# Patient Record
Sex: FEMALE | Race: BLACK OR AFRICAN AMERICAN | NOT HISPANIC OR LATINO | Employment: FULL TIME | URBAN - METROPOLITAN AREA
[De-identification: names, ages, dates, MRNs, and addresses within clinical notes are randomized per-mention and may not be internally consistent; named-entity substitution may affect disease eponyms.]

---

## 2023-03-25 LAB
CLUE CELLS: NORMAL
NUGENT SCORE: 0
YEAST: NORMAL

## 2023-05-19 LAB
ALANINE AMINOTRANSFERASE (SGPT) (U/L) IN SER/PLAS: 11 U/L (ref 7–45)
ALBUMIN (G/DL) IN SER/PLAS: 3.8 G/DL (ref 3.4–5)
ALKALINE PHOSPHATASE (U/L) IN SER/PLAS: 43 U/L (ref 33–110)
ANION GAP IN SER/PLAS: 12 MMOL/L (ref 10–20)
ASPARTATE AMINOTRANSFERASE (SGOT) (U/L) IN SER/PLAS: 14 U/L (ref 9–39)
BILIRUBIN TOTAL (MG/DL) IN SER/PLAS: 0.6 MG/DL (ref 0–1.2)
CALCIUM (MG/DL) IN SER/PLAS: 9.7 MG/DL (ref 8.6–10.6)
CARBON DIOXIDE, TOTAL (MMOL/L) IN SER/PLAS: 29 MMOL/L (ref 21–32)
CHLORIDE (MMOL/L) IN SER/PLAS: 103 MMOL/L (ref 98–107)
CHOLESTEROL (MG/DL) IN SER/PLAS: 186 MG/DL (ref 0–199)
CHOLESTEROL IN HDL (MG/DL) IN SER/PLAS: 65.1 MG/DL
CHOLESTEROL/HDL RATIO: 2.9
CREATININE (MG/DL) IN SER/PLAS: 0.84 MG/DL (ref 0.5–1.05)
ERYTHROCYTE DISTRIBUTION WIDTH (RATIO) BY AUTOMATED COUNT: 13.1 % (ref 11.5–14.5)
ERYTHROCYTE MEAN CORPUSCULAR HEMOGLOBIN CONCENTRATION (G/DL) BY AUTOMATED: 30.6 G/DL (ref 32–36)
ERYTHROCYTE MEAN CORPUSCULAR VOLUME (FL) BY AUTOMATED COUNT: 86 FL (ref 80–100)
ERYTHROCYTES (10*6/UL) IN BLOOD BY AUTOMATED COUNT: 4.99 X10E12/L (ref 4–5.2)
ESTIMATED AVERAGE GLUCOSE FOR HBA1C: 134 MG/DL
GFR FEMALE: 87 ML/MIN/1.73M2
GLUCOSE (MG/DL) IN SER/PLAS: 84 MG/DL (ref 74–99)
HEMATOCRIT (%) IN BLOOD BY AUTOMATED COUNT: 42.8 % (ref 36–46)
HEMOGLOBIN (G/DL) IN BLOOD: 13.1 G/DL (ref 12–16)
HEMOGLOBIN A1C/HEMOGLOBIN TOTAL IN BLOOD: 6.3 %
LDL: 108 MG/DL (ref 0–99)
LEUKOCYTES (10*3/UL) IN BLOOD BY AUTOMATED COUNT: 5.3 X10E9/L (ref 4.4–11.3)
NRBC (PER 100 WBCS) BY AUTOMATED COUNT: 0 /100 WBC (ref 0–0)
PLATELETS (10*3/UL) IN BLOOD AUTOMATED COUNT: 216 X10E9/L (ref 150–450)
POTASSIUM (MMOL/L) IN SER/PLAS: 3.8 MMOL/L (ref 3.5–5.3)
PROTEIN TOTAL: 7.1 G/DL (ref 6.4–8.2)
SODIUM (MMOL/L) IN SER/PLAS: 140 MMOL/L (ref 136–145)
TRIGLYCERIDE (MG/DL) IN SER/PLAS: 65 MG/DL (ref 0–149)
UREA NITROGEN (MG/DL) IN SER/PLAS: 13 MG/DL (ref 6–23)
VLDL: 13 MG/DL (ref 0–40)

## 2023-10-31 ENCOUNTER — PHARMACY VISIT (OUTPATIENT)
Dept: PHARMACY | Facility: CLINIC | Age: 48
End: 2023-10-31
Payer: COMMERCIAL

## 2023-10-31 DIAGNOSIS — I10 ESSENTIAL HYPERTENSION: Primary | ICD-10-CM

## 2023-10-31 PROCEDURE — RXMED WILLOW AMBULATORY MEDICATION CHARGE

## 2023-10-31 RX ORDER — HYDROCHLOROTHIAZIDE 25 MG/1
25 TABLET ORAL DAILY
Qty: 90 TABLET | Refills: 1 | Status: SHIPPED | OUTPATIENT
Start: 2023-10-31 | End: 2024-04-29 | Stop reason: SDUPTHER

## 2023-11-05 PROBLEM — S54.22XA: Status: ACTIVE | Noted: 2023-11-05

## 2023-11-05 PROBLEM — E55.9 VITAMIN D DEFICIENCY: Status: ACTIVE | Noted: 2023-11-05

## 2023-11-05 PROBLEM — M20.62 TOE DEFORMITY, LEFT: Status: ACTIVE | Noted: 2023-11-05

## 2023-11-05 PROBLEM — M25.462 KNEE EFFUSION, LEFT: Status: ACTIVE | Noted: 2023-11-05

## 2023-11-05 PROBLEM — R55 PRE-SYNCOPE: Status: ACTIVE | Noted: 2023-11-05

## 2023-11-05 PROBLEM — R25.1 TREMOR: Status: ACTIVE | Noted: 2023-11-05

## 2023-11-05 PROBLEM — B95.1 GBBS (GROUP B BETA HEMOLYTIC STREPTOCOCCUS) PHARYNGITIS: Status: ACTIVE | Noted: 2023-11-05

## 2023-11-05 PROBLEM — M54.50 LOWER BACK PAIN: Status: ACTIVE | Noted: 2023-11-05

## 2023-11-05 PROBLEM — J02.9 PHARYNGITIS, ACUTE: Status: ACTIVE | Noted: 2023-11-05

## 2023-11-05 PROBLEM — J45.909 ASTHMA (HHS-HCC): Status: ACTIVE | Noted: 2023-11-05

## 2023-11-05 PROBLEM — N64.89 BREAST ASYMMETRY: Status: ACTIVE | Noted: 2023-11-05

## 2023-11-05 PROBLEM — H52.229 ASTIGMATISM, REGULAR: Status: ACTIVE | Noted: 2023-11-05

## 2023-11-05 PROBLEM — M25.511 RIGHT SHOULDER PAIN: Status: ACTIVE | Noted: 2023-11-05

## 2023-11-05 PROBLEM — R10.2 PELVIC PAIN: Status: ACTIVE | Noted: 2023-11-05

## 2023-11-05 PROBLEM — M22.2X2 PATELLOFEMORAL DISORDER, LEFT: Status: ACTIVE | Noted: 2023-11-05

## 2023-11-05 PROBLEM — J02.0 GBBS (GROUP B BETA HEMOLYTIC STREPTOCOCCUS) PHARYNGITIS: Status: ACTIVE | Noted: 2023-11-05

## 2023-11-05 PROBLEM — M79.675 TOE PAIN, CHRONIC, LEFT: Status: ACTIVE | Noted: 2023-11-05

## 2023-11-05 PROBLEM — K21.9 GERD WITHOUT ESOPHAGITIS: Status: ACTIVE | Noted: 2023-11-05

## 2023-11-05 PROBLEM — M75.41 IMPINGEMENT SYNDROME OF RIGHT SHOULDER: Status: ACTIVE | Noted: 2023-11-05

## 2023-11-05 PROBLEM — J01.40 ACUTE NON-RECURRENT PANSINUSITIS: Status: ACTIVE | Noted: 2023-11-05

## 2023-11-05 PROBLEM — M25.549 PAIN IN JOINT, HAND: Status: ACTIVE | Noted: 2023-11-05

## 2023-11-05 PROBLEM — N63.20 LEFT BREAST LUMP: Status: ACTIVE | Noted: 2023-11-05

## 2023-11-05 PROBLEM — R10.9 ABDOMINAL PAIN, RIGHT LATERAL: Status: ACTIVE | Noted: 2023-11-05

## 2023-11-05 PROBLEM — I10 ESSENTIAL HYPERTENSION: Status: ACTIVE | Noted: 2023-11-05

## 2023-11-05 PROBLEM — G89.29 TOE PAIN, CHRONIC, LEFT: Status: ACTIVE | Noted: 2023-11-05

## 2023-11-07 ENCOUNTER — TELEPHONE (OUTPATIENT)
Dept: PRIMARY CARE | Facility: CLINIC | Age: 48
End: 2023-11-07

## 2023-11-07 ENCOUNTER — PHARMACY VISIT (OUTPATIENT)
Dept: PHARMACY | Facility: CLINIC | Age: 48
End: 2023-11-07
Payer: COMMERCIAL

## 2023-11-07 ENCOUNTER — OFFICE VISIT (OUTPATIENT)
Dept: PRIMARY CARE | Facility: CLINIC | Age: 48
End: 2023-11-07
Payer: COMMERCIAL

## 2023-11-07 VITALS — BODY MASS INDEX: 27.28 KG/M2 | WEIGHT: 180 LBS | HEIGHT: 68 IN

## 2023-11-07 DIAGNOSIS — J45.20 INTERMITTENT ASTHMA, UNSPECIFIED ASTHMA SEVERITY, UNSPECIFIED WHETHER COMPLICATED (HHS-HCC): Primary | ICD-10-CM

## 2023-11-07 PROCEDURE — 99213 OFFICE O/P EST LOW 20 MIN: CPT | Performed by: FAMILY MEDICINE

## 2023-11-07 PROCEDURE — 3080F DIAST BP >= 90 MM HG: CPT | Performed by: FAMILY MEDICINE

## 2023-11-07 PROCEDURE — 3077F SYST BP >= 140 MM HG: CPT | Performed by: FAMILY MEDICINE

## 2023-11-07 PROCEDURE — RXMED WILLOW AMBULATORY MEDICATION CHARGE

## 2023-11-07 RX ORDER — ALBUTEROL SULFATE 90 UG/1
2 AEROSOL, METERED RESPIRATORY (INHALATION) EVERY 4 HOURS PRN
Qty: 8.5 G | Refills: 0 | Status: SHIPPED | OUTPATIENT
Start: 2023-11-07 | End: 2024-11-06

## 2023-11-07 NOTE — PROGRESS NOTES
"Chief Complaint:   No chief complaint on file.     History Of Present Illness:    Niranjan Rodrigues is a 48 y.o. female with a past medical history of asthma, recurrent sinusitis and seasonal allergies who is presenting for blood pressure.        She would like a rescue inhaler.      May 2021 visit: Left knee: She has had left knee pain for the past few months even before her GYN procedure in March. She is trying to get in good shape and go to the gym. She notices it most when she is sitting and then stands up she will notice in the posterior aspect of her left knee. She does like to bowl and uses an knee brace, we discussed getting another knee brace for her which would offer more support. She likes participate in water aerobics but still has knee pain with water aerobics. She does squats in the gym which do cause pain in the anterior aspect of her left knee.   May 2023 update: injection was temporarily helpful, does not want another injection now. she is still bowling. reaction knee brace is helpful but she needs another one.   Nov 2023 update: still didn't get reaction knee brace. Would like it for bowling.           Anemia - CBC done in October 2020, but was told she had low iron levels, however low suspicion as MCV within normal limits.     Left chest wall pain. Had before in 2015 and saw cardiologist.      Allergies - seasonal allergies that last year-round and has sinus-related symptoms (dry eyes, runny nose, sore throat). Has tried nasal spray in past with no relief. Patient is agreeable to seeing Allergy Specialist in future. April 2022 update: has seen [facility], feels better on nasal sprays. needs albuterol refill. take claritin or zyrtec. May 2023 update: she thinks she may have a sinus infection. coughing up phlegm, we discussed an antibiotic.      Asthma - Patient takes Albuterol PRN and uses 1-2 times on \"bad day\". No recent exacerbations, but would like refill of Albuterol.     BP/HLD - concerns as " family history is indicates Hypertension and Hyperlipidemia. Patient instructed to get blood pressure cuff and maintain a blood pressure diary and report back on follow-up. Patient has borderline high cholesterol, but dietary modifications and exercise have worked well for her. 2022 update: agreed to start hctz and get a home bp monitor. Oct 2022 update: 100s-120 systolic. agreed to continue hctz. sometimes she notices blood pressure spikes with certain foods.         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 2022 update: she will see ortho/Est[facility]. Oct 2022 update: hemp topical seems to be helping.      PMHx: Asthma, arthritis of wrist (Left), bursitis of shoulder (right), sinusitis  Past surgical Hx: partial hysterectomy with left oophorectomy secondary to ovarian mass, nerve entrapment of left wrist. GYN procedure .   Allergies: seasonal, Advil (palpitations), Tramadol, Tylenol 3  Medications: Aleve, Albuterol. estradiol.    OTC: Biotin, Vitamin-C, Vitamin-D, elderberry, multivitamin  Social: no tobacco use, alcohol 1-2 times per month socially, soda 1-2 times per week, exercises regularly (4 times weekly, bowling twice weekly, water aerobics weekly), avoids red meat and pork,   Occupation:  ( employee). Medical records (previously at [facility] [place] [facility]) at [facility].   Family History: Mom -  at 61 from lung cancer, sister -  at 44 from esophageal cancer, father -  (history of emphysema). Diabetes, Hypertension, hyperlipidemia on maternal side        Tdap: 2021  COVID: vaccinated and boosted.   Mammogram: 2023  GYN: saw GYN 2023.      Colonoscopy/EGD: May 2018- unremarkable. repeat colonoscopy . .       Review of Systems:    Negative  Constitutional: fever, chills, night sweats,  unexpected weight change, changes in sleep  Eyes: loss of vision, double vision, floaters  Ear/Nose/Throat/Mouth: sore throat, hearing changes, sinus congestion  Cardiovascular: chest pain, chest heaviness, palpitations, swelling in ankles  Psychological: feelings of depression, feelings of anxiety.  Endocrine: excessive thirst, feeling too hot, feeling too cold, feeling tired, fatigue  Hematologic/Lymphatic: swollen glands, blood clotting problems, easy bruising.   Neurological: loss of consciousness, tremors, dizzy spells, numbness, tingling.  Gastrointestinal: abdominal pain, nausea, vomiting, indigestion, heartburn, sour taste in throat when waking up, constipation, diarrhea, bloody stools, loss of bowel control  Genitourinary: urinary incontinence, increased urinary frequency, painful urination, blood in urine  Skin: Rash, lumps or bumps, worrisome moles  Sexual: sexual health concerns      Positive  Psychological: feeling generally happy, feeling safe at home      Last Recorded Vitals:  There were no vitals filed for this visit.    Past Medical History:  She has a past medical history of Abnormal uterine and vaginal bleeding, unspecified, Acute vaginitis (10/24/2016), Acute vaginitis (01/04/2016), Encounter for screening for malignant neoplasm of vagina, Other conditions influencing health status, Other injury of unspecified body region, initial encounter (08/29/2014), Other specified noninflammatory disorders of vagina (12/31/2015), Other specified noninflammatory disorders of vagina (05/27/2016), Pain, unspecified (03/22/2018), Personal history of diseases of the skin and subcutaneous tissue (09/23/2013), Personal history of other diseases of the female genital tract (04/27/2015), Personal history of other diseases of the female genital tract, Personal history of other diseases of the respiratory system, Personal history of other medical treatment, Personal history of other specified conditions (12/08/2015),  Urinary tract infection, site not specified (11/27/2018), and Urinary tract infection, site not specified (09/16/2017).    Past Surgical History:  She has a past surgical history that includes Other surgical history (03/10/2014); Other surgical history (03/10/2014); Other surgical history (03/23/2021); and Hysterectomy (03/21/2018).      Social History:  She has no history on file for tobacco use, alcohol use, and drug use.    Family History:  Family History   Problem Relation Name Age of Onset    Diabetes Mother      Other (chronic kidney disease) Mother      Lung cancer Mother      Emphysema Father      Other (esophageal abnormality [Other]) Sister      Cancer Sister      Diabetes Maternal Grandmother          Allergies:  Acetaminophen-codeine, Oxycodone-acetaminophen, Shellfish containing products, Shrimp, and Tramadol    Outpatient Medications:  Current Outpatient Medications   Medication Instructions    estradiol (Estrace) 0.01 % (0.1 mg/gram) vaginal cream INSERT [1] GRAM VAGINALLY TWO NIGHTS PER WEEK    estradiol (Vivelle-DOT) 0.075 mg/24 hr patch APPLY 1 PATCH TRANSDERMALLY TWO TIMES A WEEK    hydroCHLOROthiazide (HYDRODiuril) 25 mg tablet TAKE 1 TABLET BY MOUTH DAILY AS DIRECTED       Physical Exam:  GENERAL: Well developed, well nourished, alert and cooperative, and appears to be in no acute distress.  PSYCH: mood pleasant and appropriate  HEAD: normocephalic  EYES: PERRL, EOMI. vision is grossly intact.   EARS: Hearing grossly intact. External auditory canals clear. Right tympanic membrane clear. Left tympanic membrane clear.  NOSE:  Nares patent b/l. No bleeding nasal polyps. No nasal discharge.  THROAT:  Oral cavity and pharynx clear. No inflammation, swelling, exudate, or lesions.  Teeth and gingiva in good general condition.  NECK: Neck supple, non-tender. No masses, no thyromegaly. No anterior cervical lymphadenopathy.  CARDIAC: Normal S1 and S2. No murmur. Rhythm is regular. Brisk capillary refill.  No carotid bruits. No appreciable JVD.  LUNGS: Clear to auscultation without rales, rhonchi, wheezing.  ABD: soft  GAIT: Normal  SKIN: Skin normal color, texture and turgor with no lesions or eruptions.     Last Labs:  CBC -  Lab Results   Component Value Date    WBC 5.3 05/19/2023    HGB 13.1 05/19/2023    HCT 42.8 05/19/2023    MCV 86 05/19/2023     05/19/2023       CMP -  Lab Results   Component Value Date    CALCIUM 9.7 05/19/2023    PROT 7.1 05/19/2023    ALBUMIN 3.8 05/19/2023    AST 14 05/19/2023    ALT 11 05/19/2023    ALKPHOS 43 05/19/2023    BILITOT 0.6 05/19/2023       LIPID PANEL -   Lab Results   Component Value Date    CHOL 186 05/19/2023    TRIG 65 05/19/2023    HDL 65.1 05/19/2023    CHHDL 2.9 05/19/2023    LDLF 108 (H) 05/19/2023    VLDL 13 05/19/2023         Lab Results   Component Value Date    HGBA1C 6.3 (A) 05/19/2023       Assessment/Plan   Problem List Items Addressed This Visit             ICD-10-CM    Asthma - Primary J45.909     Inhaler rx done.    A1c test was going in the wrong direction- when we recheck labs in 5/2024- hope that it is heading in the right direction. Good job on weight loss.    Will look into reaction knee brace.     Follow up May 2024.     Hung Zhao,

## 2023-12-01 DIAGNOSIS — M22.2X2 PATELLOFEMORAL DISORDER, LEFT: Primary | ICD-10-CM

## 2023-12-01 NOTE — PROGRESS NOTES
Pt plans to stop by Dec 4; 1230pm to  knee brace.    Patient was prescribed a DonJoy reaction brace for left patellofemoral syndrome.The patient is ambulatory with or without aid; but, has weakness, instability and/or deformity of their left knee which requires stabilization from this orthosis to improve their function.      Verbal and written instructions for the use, wear schedule, cleaning and application of this item were given.  Patient was instructed that should the brace result in increased pain, decreased sensation, increased swelling, or an overall worsening of their medical condition, to please contact our office immediately.     Orthotic management and training was provided for skin care, modifications due to healing tissues, edema changes, interruption in skin integrity, and safety precautions with the orthosis.

## 2023-12-06 PROCEDURE — RXMED WILLOW AMBULATORY MEDICATION CHARGE

## 2023-12-08 ENCOUNTER — PHARMACY VISIT (OUTPATIENT)
Dept: PHARMACY | Facility: CLINIC | Age: 48
End: 2023-12-08
Payer: COMMERCIAL

## 2024-01-04 PROCEDURE — RXMED WILLOW AMBULATORY MEDICATION CHARGE

## 2024-01-05 ENCOUNTER — PHARMACY VISIT (OUTPATIENT)
Dept: PHARMACY | Facility: CLINIC | Age: 49
End: 2024-01-05
Payer: COMMERCIAL

## 2024-01-18 ENCOUNTER — OFFICE VISIT (OUTPATIENT)
Dept: ORTHOPEDIC SURGERY | Facility: HOSPITAL | Age: 49
End: 2024-01-18
Payer: COMMERCIAL

## 2024-01-18 ENCOUNTER — HOSPITAL ENCOUNTER (OUTPATIENT)
Dept: RADIOLOGY | Facility: HOSPITAL | Age: 49
Discharge: HOME | End: 2024-01-18
Payer: COMMERCIAL

## 2024-01-18 VITALS — WEIGHT: 174 LBS | BODY MASS INDEX: 26.37 KG/M2 | HEIGHT: 68 IN

## 2024-01-18 DIAGNOSIS — M25.562 LEFT KNEE PAIN: ICD-10-CM

## 2024-01-18 DIAGNOSIS — S83.242A TEAR OF MEDIAL MENISCUS OF LEFT KNEE, UNSPECIFIED TEAR TYPE, UNSPECIFIED WHETHER OLD OR CURRENT TEAR, INITIAL ENCOUNTER: ICD-10-CM

## 2024-01-18 DIAGNOSIS — M25.562 LEFT KNEE PAIN: Primary | ICD-10-CM

## 2024-01-18 PROCEDURE — 1036F TOBACCO NON-USER: CPT | Performed by: ORTHOPAEDIC SURGERY

## 2024-01-18 PROCEDURE — 73562 X-RAY EXAM OF KNEE 3: CPT | Mod: LEFT SIDE | Performed by: RADIOLOGY

## 2024-01-18 PROCEDURE — 73562 X-RAY EXAM OF KNEE 3: CPT | Mod: LT

## 2024-01-18 PROCEDURE — 99213 OFFICE O/P EST LOW 20 MIN: CPT | Performed by: ORTHOPAEDIC SURGERY

## 2024-01-18 PROCEDURE — 99203 OFFICE O/P NEW LOW 30 MIN: CPT | Performed by: ORTHOPAEDIC SURGERY

## 2024-01-18 ASSESSMENT — PAIN - FUNCTIONAL ASSESSMENT: PAIN_FUNCTIONAL_ASSESSMENT: 0-10

## 2024-01-18 ASSESSMENT — PAIN DESCRIPTION - DESCRIPTORS: DESCRIPTORS: SHARP

## 2024-01-18 ASSESSMENT — PAIN SCALES - GENERAL: PAINLEVEL_OUTOF10: 3

## 2024-01-19 NOTE — PROGRESS NOTES
48-year-old is seen with left knee pain that has been present for a couple of years.  There is a intermittent severe sharp shooting pain that is worse with standing and walking.  There is intermittent locking and swelling and giving way.  She had a cortisone injection and used a knee sleeve with mild relief.  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  She is done physical therapy and progress to home program.  Her symptoms have persisted and worsened recently.    Pleasant and in no acute distress. Ambulates with a mildly antalgic gait.  Left knee range of motion is 3-120. There is a mild effusion and no instability. The knee is stable to varus and valgus stress Lachman and posterior drawer. There is medial joint line tenderness and  Ad's is positive with pain medially.  Right knee range of motion is 0-130 without effusion or instability. There is no tenderness around the left knee. Bilateral lower extremities are well-perfused the skin is intact and muscle tone is adequate.  There is adequate range of motion of his hips without significant pain.    Multiple x-ray views of the left knee are personally reviewed and there is no acute bony abnormality.    A discussion about her knee pain was done.  Her history and exam findings are concerning for a medial meniscus tear.  To continue with the exercise program and avoid aggravating activities.  She can use Tylenol or an NSAID.  She was sent for an MRI to evaluate for a medial meniscus tear.  Potential need for knee arthroscopy depending on the MRI result.

## 2024-01-24 PROCEDURE — RXMED WILLOW AMBULATORY MEDICATION CHARGE

## 2024-01-25 ENCOUNTER — PHARMACY VISIT (OUTPATIENT)
Dept: PHARMACY | Facility: CLINIC | Age: 49
End: 2024-01-25
Payer: COMMERCIAL

## 2024-02-03 ENCOUNTER — HOSPITAL ENCOUNTER (OUTPATIENT)
Dept: RADIOLOGY | Facility: HOSPITAL | Age: 49
Discharge: HOME | End: 2024-02-03
Payer: COMMERCIAL

## 2024-02-03 ENCOUNTER — APPOINTMENT (OUTPATIENT)
Dept: RADIOLOGY | Facility: HOSPITAL | Age: 49
End: 2024-02-03
Payer: COMMERCIAL

## 2024-02-03 DIAGNOSIS — M25.562 LEFT KNEE PAIN: ICD-10-CM

## 2024-02-03 DIAGNOSIS — S83.242A TEAR OF MEDIAL MENISCUS OF LEFT KNEE, UNSPECIFIED TEAR TYPE, UNSPECIFIED WHETHER OLD OR CURRENT TEAR, INITIAL ENCOUNTER: ICD-10-CM

## 2024-02-03 PROCEDURE — 73721 MRI JNT OF LWR EXTRE W/O DYE: CPT | Mod: LEFT SIDE | Performed by: RADIOLOGY

## 2024-02-03 PROCEDURE — 73721 MRI JNT OF LWR EXTRE W/O DYE: CPT | Mod: LT

## 2024-02-05 DIAGNOSIS — M17.12 PATELLOFEMORAL ARTHRITIS OF LEFT KNEE: Primary | ICD-10-CM

## 2024-02-15 ENCOUNTER — OFFICE VISIT (OUTPATIENT)
Dept: ORTHOPEDIC SURGERY | Facility: HOSPITAL | Age: 49
End: 2024-02-15
Payer: COMMERCIAL

## 2024-02-15 VITALS — BODY MASS INDEX: 26.37 KG/M2 | HEIGHT: 68 IN | WEIGHT: 174 LBS

## 2024-02-15 DIAGNOSIS — M17.12 ARTHRITIS OF KNEE, LEFT: Primary | ICD-10-CM

## 2024-02-15 DIAGNOSIS — M25.562 CHRONIC PAIN OF LEFT KNEE: ICD-10-CM

## 2024-02-15 DIAGNOSIS — G89.29 CHRONIC PAIN OF LEFT KNEE: ICD-10-CM

## 2024-02-15 PROCEDURE — 20610 DRAIN/INJ JOINT/BURSA W/O US: CPT | Performed by: ORTHOPAEDIC SURGERY

## 2024-02-15 PROCEDURE — 2500000005 HC RX 250 GENERAL PHARMACY W/O HCPCS: Performed by: ORTHOPAEDIC SURGERY

## 2024-02-15 PROCEDURE — 99213 OFFICE O/P EST LOW 20 MIN: CPT | Performed by: ORTHOPAEDIC SURGERY

## 2024-02-15 PROCEDURE — 1036F TOBACCO NON-USER: CPT | Performed by: ORTHOPAEDIC SURGERY

## 2024-02-15 PROCEDURE — 2500000004 HC RX 250 GENERAL PHARMACY W/ HCPCS (ALT 636 FOR OP/ED): Performed by: ORTHOPAEDIC SURGERY

## 2024-02-15 RX ORDER — LIDOCAINE HYDROCHLORIDE 10 MG/ML
2 INJECTION INFILTRATION; PERINEURAL
Status: COMPLETED | OUTPATIENT
Start: 2024-02-15 | End: 2024-02-15

## 2024-02-15 RX ORDER — TRIAMCINOLONE ACETONIDE 40 MG/ML
40 INJECTION, SUSPENSION INTRA-ARTICULAR; INTRAMUSCULAR
Status: COMPLETED | OUTPATIENT
Start: 2024-02-15 | End: 2024-02-15

## 2024-02-15 RX ADMIN — LIDOCAINE HYDROCHLORIDE 2 ML: 10 INJECTION, SOLUTION INFILTRATION; PERINEURAL at 17:03

## 2024-02-15 RX ADMIN — TRIAMCINOLONE ACETONIDE 40 MG: 400 INJECTION, SUSPENSION INTRA-ARTICULAR; INTRAMUSCULAR at 17:03

## 2024-02-15 ASSESSMENT — PAIN DESCRIPTION - DESCRIPTORS: DESCRIPTORS: DULL

## 2024-02-15 ASSESSMENT — PAIN SCALES - GENERAL: PAINLEVEL_OUTOF10: 5 - MODERATE PAIN

## 2024-02-15 ASSESSMENT — PAIN - FUNCTIONAL ASSESSMENT: PAIN_FUNCTIONAL_ASSESSMENT: 0-10

## 2024-02-15 NOTE — PROGRESS NOTES
48-year-old is seen with left knee pain.  She has been having persistent severe sharp shooting pain along the anterior aspect of the left knee.  She had pain for several years.  She is done physical therapy on 2 occasions and continues with a home exercise program.  She has had a cortisone injection in the past.  She has used various knee sleeves in the patellar tendon band with mild relief.  She continues with a regular exercise program.    Pleasant in no acute distress.  Walks an antalgic gait.  Left knee has a mild effusion.  Range of motion is 3 to 120 degrees.  The knee is stable to varus and valgus stress Lachman and posterior drawer.  There is pain with patellofemoral crepitus and pain patellofemoral grind.  Right knee range of motion is 0 to 125 degrees without effusion instability or tenderness.  Both lower extremities are well-perfused the skin is intact and muscle tone is adequate.    MRI of the left knee is personally reviewed and there is articular cartilage loss along the lateral patellar facet.  The medial and lateral menisci are intact.    A discussion about arthritis was done.  Treatment options were reviewed.  She will continue with the physical therapy home exercise program.  She can use Tylenol or an NSAID.  Decision was made to proceed with cortisone injection.  She will avoid aggravating activities and use a knee sleeve as needed.  Viscosupplementation could be done if she persists with symptoms.    L Inj/Asp: L knee on 2/15/2024 5:03 PM  Indications: pain  Details: 22 G needle, superolateral approach  Medications: 40 mg triamcinolone acetonide 40 mg/mL; 2 mL lidocaine 10 mg/mL (1 %)  Procedure, treatment alternatives, risks and benefits explained, specific risks discussed. Consent was given by the patient.

## 2024-03-19 NOTE — PROGRESS NOTES
"Subjective    Niranjan Rodrigues is a 48 y.o. female who is here for a routine exam. Periods are regular. No intermenstrual bleeding, spotting, or discharge. S/p TLH/BSO  On estradiol patch .075 twice weekly.  Was prescribed vaginal estrogen last year and it's working well.      Last pap: s/p hyst, remote h/o cryotherapy in her 20s  Last mammogram:  Negative  Social History:  , sexually active.    Review of Systems  Constitutional: no fever, no chills, no recent weight gain, no recent weight loss and no fatigue.   Eyes: no eye pain, no vision problems and no dryness of the eyes.   ENT: no hearing loss, no nosebleeds and no sinus congestion.   Cardiovascular: no chest pain, no palpitations and no orthopnea.   Respiratory: no shortness of breath, no cough and no wheezing.   Gastrointestinal: no abdominal pain, no constipation, no nausea, no diarrhea and no vomiting.   Genitourinary: no dysuria, no urinary incontinence, no vaginal dryness, no vaginal itching, no dyspareunia, no pelvic pain, no dysmenorrhea, no sexual problems, no change in urinary frequency, no vaginal discharge, no unexplained vaginal bleeding and no lesion/sore.   Musculoskeletal: no back pain, no joint swelling and no leg edema.   Integumentary: no rashes, no skin lesions, no nipple discharge, no breast pain and no breast lump.   Neurological: no headache, no numbness and no dizziness.   Psychiatric: no sleep disturbances, no anxiety and no depression.   Endocrine: no hot flashes, no loss of hair and no hirsutism.   Hematologic/Lymphatic: no swollen glands, no tendency for easy bleeding and no tendency for easy bruising.       Objective   /85   Ht 1.727 m (5' 8\")   Wt 79.4 kg (175 lb)   BMI 26.61 kg/m²        General:   Alert and oriented, in no acute distress   Neck: Supple. No visible thyromegaly.    Breast/Axilla: Normal to palpation bilaterally without masses, skin changes, or nipple discharge.    Abdomen: Soft, non-tender, without " masses or organomegaly   Vulva: Normal architecture without erythema, masses, or lesions.    Vagina: Normal mucosa without lesions, masses, or atrophy. No abnormal vaginal discharge.    Cervix: Surgically absent   Uterus: Surgically absent   Adnexa: Surgically absent   Pelvic Floor No POP noted. No high tone pelvic floor    Psych Normal affect. Normal mood.          Assessment/Plan   Annual exam - discussed diet, exercise, self breast awareness, mammogram and pap guidelines.  No further paps indicated.

## 2024-03-20 ENCOUNTER — OFFICE VISIT (OUTPATIENT)
Dept: OBSTETRICS AND GYNECOLOGY | Facility: CLINIC | Age: 49
End: 2024-03-20
Payer: COMMERCIAL

## 2024-03-20 VITALS
BODY MASS INDEX: 26.52 KG/M2 | SYSTOLIC BLOOD PRESSURE: 132 MMHG | DIASTOLIC BLOOD PRESSURE: 85 MMHG | WEIGHT: 175 LBS | HEIGHT: 68 IN

## 2024-03-20 DIAGNOSIS — Z12.31 ENCOUNTER FOR SCREENING MAMMOGRAM FOR MALIGNANT NEOPLASM OF BREAST: ICD-10-CM

## 2024-03-20 DIAGNOSIS — Z01.419 ENCOUNTER FOR ANNUAL ROUTINE GYNECOLOGICAL EXAMINATION: Primary | ICD-10-CM

## 2024-03-20 PROCEDURE — 3079F DIAST BP 80-89 MM HG: CPT | Performed by: OBSTETRICS & GYNECOLOGY

## 2024-03-20 PROCEDURE — 99386 PREV VISIT NEW AGE 40-64: CPT | Performed by: OBSTETRICS & GYNECOLOGY

## 2024-03-20 PROCEDURE — 1036F TOBACCO NON-USER: CPT | Performed by: OBSTETRICS & GYNECOLOGY

## 2024-03-20 PROCEDURE — 3075F SYST BP GE 130 - 139MM HG: CPT | Performed by: OBSTETRICS & GYNECOLOGY

## 2024-03-20 PROCEDURE — RXMED WILLOW AMBULATORY MEDICATION CHARGE

## 2024-03-20 RX ORDER — BISMUTH SUBSALICYLATE 262 MG
1 TABLET,CHEWABLE ORAL DAILY
COMMUNITY

## 2024-03-20 RX ORDER — ESTRADIOL 0.07 MG/D
FILM, EXTENDED RELEASE TRANSDERMAL
Qty: 24 PATCH | Refills: 3 | Status: SHIPPED | OUTPATIENT
Start: 2024-03-20 | End: 2025-03-20

## 2024-03-20 RX ORDER — B-COMPLEX WITH VITAMIN C
1 TABLET ORAL DAILY
COMMUNITY

## 2024-03-20 RX ORDER — ESTRADIOL 0.1 MG/G
CREAM VAGINAL
Qty: 42.5 G | Refills: 3 | Status: SHIPPED | OUTPATIENT
Start: 2024-03-20 | End: 2025-03-20

## 2024-03-20 RX ORDER — FERROUS SULFATE, DRIED 160(50) MG
1 TABLET, EXTENDED RELEASE ORAL DAILY
COMMUNITY

## 2024-03-20 ASSESSMENT — COLUMBIA-SUICIDE SEVERITY RATING SCALE - C-SSRS
6. HAVE YOU EVER DONE ANYTHING, STARTED TO DO ANYTHING, OR PREPARED TO DO ANYTHING TO END YOUR LIFE?: NO
2. HAVE YOU ACTUALLY HAD ANY THOUGHTS OF KILLING YOURSELF?: NO
1. IN THE PAST MONTH, HAVE YOU WISHED YOU WERE DEAD OR WISHED YOU COULD GO TO SLEEP AND NOT WAKE UP?: NO

## 2024-03-20 ASSESSMENT — PATIENT HEALTH QUESTIONNAIRE - PHQ9
SUM OF ALL RESPONSES TO PHQ9 QUESTIONS 1 AND 2: 0
1. LITTLE INTEREST OR PLEASURE IN DOING THINGS: NOT AT ALL
2. FEELING DOWN, DEPRESSED OR HOPELESS: NOT AT ALL

## 2024-03-22 ENCOUNTER — PHARMACY VISIT (OUTPATIENT)
Dept: PHARMACY | Facility: CLINIC | Age: 49
End: 2024-03-22
Payer: COMMERCIAL

## 2024-04-11 ENCOUNTER — HOSPITAL ENCOUNTER (OUTPATIENT)
Dept: RADIOLOGY | Facility: CLINIC | Age: 49
Discharge: HOME | End: 2024-04-11
Payer: COMMERCIAL

## 2024-04-11 ENCOUNTER — APPOINTMENT (OUTPATIENT)
Dept: RADIOLOGY | Facility: CLINIC | Age: 49
End: 2024-04-11
Payer: COMMERCIAL

## 2024-04-11 VITALS — WEIGHT: 172 LBS | BODY MASS INDEX: 26.15 KG/M2

## 2024-04-11 DIAGNOSIS — Z12.31 ENCOUNTER FOR SCREENING MAMMOGRAM FOR MALIGNANT NEOPLASM OF BREAST: ICD-10-CM

## 2024-04-11 PROCEDURE — 77067 SCR MAMMO BI INCL CAD: CPT | Performed by: RADIOLOGY

## 2024-04-11 PROCEDURE — 77063 BREAST TOMOSYNTHESIS BI: CPT | Performed by: RADIOLOGY

## 2024-04-11 PROCEDURE — 77067 SCR MAMMO BI INCL CAD: CPT

## 2024-04-29 DIAGNOSIS — I10 ESSENTIAL HYPERTENSION: ICD-10-CM

## 2024-04-30 PROCEDURE — RXMED WILLOW AMBULATORY MEDICATION CHARGE

## 2024-04-30 RX ORDER — HYDROCHLOROTHIAZIDE 25 MG/1
25 TABLET ORAL DAILY
Qty: 90 TABLET | Refills: 1 | Status: SHIPPED | OUTPATIENT
Start: 2024-04-30 | End: 2025-04-30

## 2024-05-01 ENCOUNTER — PHARMACY VISIT (OUTPATIENT)
Dept: PHARMACY | Facility: CLINIC | Age: 49
End: 2024-05-01
Payer: COMMERCIAL

## 2024-05-07 ENCOUNTER — OFFICE VISIT (OUTPATIENT)
Dept: PRIMARY CARE | Facility: CLINIC | Age: 49
End: 2024-05-07
Payer: COMMERCIAL

## 2024-05-07 VITALS
SYSTOLIC BLOOD PRESSURE: 138 MMHG | HEART RATE: 74 BPM | WEIGHT: 180 LBS | DIASTOLIC BLOOD PRESSURE: 93 MMHG | HEIGHT: 68 IN | BODY MASS INDEX: 27.28 KG/M2

## 2024-05-07 DIAGNOSIS — Z00.00 WELL ADULT EXAM: Primary | ICD-10-CM

## 2024-05-07 LAB
ERYTHROCYTE [DISTWIDTH] IN BLOOD BY AUTOMATED COUNT: 13.9 % (ref 11.5–14.5)
HCT VFR BLD AUTO: 45.2 % (ref 36–46)
HGB BLD-MCNC: 13.4 G/DL (ref 12–16)
MCH RBC QN AUTO: 26.5 PG (ref 26–34)
MCHC RBC AUTO-ENTMCNC: 29.6 G/DL (ref 32–36)
MCV RBC AUTO: 90 FL (ref 80–100)
NRBC BLD-RTO: 0 /100 WBCS (ref 0–0)
PLATELET # BLD AUTO: 259 X10*3/UL (ref 150–450)
RBC # BLD AUTO: 5.05 X10*6/UL (ref 4–5.2)
WBC # BLD AUTO: 5.1 X10*3/UL (ref 4.4–11.3)

## 2024-05-07 PROCEDURE — 84443 ASSAY THYROID STIM HORMONE: CPT

## 2024-05-07 PROCEDURE — 80053 COMPREHEN METABOLIC PANEL: CPT

## 2024-05-07 PROCEDURE — 85027 COMPLETE CBC AUTOMATED: CPT

## 2024-05-07 PROCEDURE — 83036 HEMOGLOBIN GLYCOSYLATED A1C: CPT

## 2024-05-07 PROCEDURE — 3080F DIAST BP >= 90 MM HG: CPT | Performed by: FAMILY MEDICINE

## 2024-05-07 PROCEDURE — 36415 COLL VENOUS BLD VENIPUNCTURE: CPT

## 2024-05-07 PROCEDURE — 3075F SYST BP GE 130 - 139MM HG: CPT | Performed by: FAMILY MEDICINE

## 2024-05-07 PROCEDURE — 80061 LIPID PANEL: CPT

## 2024-05-07 PROCEDURE — 1036F TOBACCO NON-USER: CPT | Performed by: FAMILY MEDICINE

## 2024-05-07 PROCEDURE — 99396 PREV VISIT EST AGE 40-64: CPT | Performed by: FAMILY MEDICINE

## 2024-05-07 ASSESSMENT — ENCOUNTER SYMPTOMS
DEPRESSION: 0
OCCASIONAL FEELINGS OF UNSTEADINESS: 0
LOSS OF SENSATION IN FEET: 0

## 2024-05-07 NOTE — PROGRESS NOTES
"Ptis here for annual exam, no concerns.       Chief Complaint:  No chief complaint on file.  History Of Present Illness:   Niranjan Rodrigues is a 49 y.o. female          Niranjan Rodrigues is a 49 y.o. female with a past medical history of asthma, recurrent sinusitis and seasonal allergies who is presenting for a physical.    Last HgA1c- 6.3%                        Anemia - CBC done in October 2020, but was told she had low iron levels, however low suspicion as MCV within normal limits.     Left chest wall pain. Had before in 2015 and saw cardiologist.      Allergies - seasonal allergies that last year-round and has sinus-related symptoms (dry eyes, runny nose, sore throat). Has tried nasal spray in past with no relief. Patient is agreeable to seeing Allergy Specialist in future. April 2022 update: has seen [facility], feels better on nasal sprays. needs albuterol refill. take claritin or zyrtec. May 2023 update: she thinks she may have a sinus infection. coughing up phlegm, we discussed an antibiotic.      Asthma - Patient takes Albuterol PRN and uses 1-2 times on \"bad day\". No recent exacerbations, but would like refill of Albuterol.     BP/HLD - concerns as family history is indicates Hypertension and Hyperlipidemia. Patient instructed to get blood pressure cuff and maintain a blood pressure diary and report back on follow-up. Patient has borderline high cholesterol, but dietary modifications and exercise have worked well for her. April 2022 update: agreed to start hctz and get a home bp monitor. Oct 2022 update: 100s-120 systolic. agreed to continue hctz. sometimes she notices blood pressure spikes with certain foods.   May 2024 update: bp well controlled on hydrochlorothiazide.         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 April 2022 update: she will see " ortho/Estel[facility]. Oct 2022 update: hemp topical seems to be helping. May 2024 update: seems to be doing ok.     May 2021 visit: Left knee: She has had left knee pain for the past few months even before her GYN procedure in March. She is trying to get in good shape and go to the gym. She notices it most when she is sitting and then stands up she will notice in the posterior aspect of her left knee. She does like to bowl and uses an knee brace, we discussed getting another knee brace for her which would offer more support. She likes participate in water aerobics but still has knee pain with water aerobics. She does squats in the gym which do cause pain in the anterior aspect of her left knee.   May 2023 update: injection was temporarily helpful, does not want another injection now. she is still bowling. reaction knee brace is helpful but she needs another one.   2023 update: still didn't get reaction knee brace. Would like it for bowling.   May 2024 update: got another injection from ortho that is currently helpful.      PMHx: Asthma, arthritis of wrist (Left), bursitis of shoulder (right), sinusitis  Past surgical Hx: partial hysterectomy with left oophorectomy secondary to ovarian mass, nerve entrapment of left wrist. GYN procedure .     Allergies: seasonal, Advil (palpitations), Tramadol, Tylenol 3    Medications: Aleve, Albuterol. estradiol.    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     Family History: Mom -  at 61 from lung cancer, sister -  at 44 from esophageal cancer, father -  (history of emphysema). Diabetes, Hypertension, hyperlipidemia on  "maternal side        Tdap: Jan 6, 2021  COVID: vaccinated and boosted.   Mammogram: April 2024  GYN: saw GYN March 2024     Colonoscopy/EGD: May 2018- unremarkable. repeat colonoscopy 2028. .     Exercise: wears knee sleeve and knee brace, working out.                    Review of Systems:     Negative  Constitutional:   - fever   - chills   - night sweats  - unexpected weight change  - changes in sleep     Eyes:   - loss of vision  - double vision  - floaters     Ear/Nose/Throat/Mouth:   - sore throat  - hearing changes  - sinus congestion     Cardiovascular:   - chest pain  - chest heaviness  - palpitations  - swelling in ankles          Respiratory:   - shortness of breath  - difficulty breathing  - frequent cough  - wheezing     Neurological:   - loss of consciousness  - tremors  - dizzy spells  - numbness   - tingling     Gastrointestinal:   - abdominal pain  - nausea  - vomiting  - constipation  - diarrhea  - bloody stools  - loss of bowel control  - indigestion  - heartburn  - sour taste in throat when waking up     Genitourinary:   - urinary incontinence  - increased urinary frequency  - painful urination  - blood in urine     Skin:   - Rash  - lumps or bumps  - worrisome moles     Endocrine:   - excessive thirst  - feeling too hot  - feeling too cold  - feeling tired  - fatigue      Hematologic/Lymphatic:   - swollen glands  - blood clotting problems  - easy bruising.     Psychological:   - feelings of depression  - feelings of anxiety.     Sexual:   - sexual health concerns        Positive  Psychological:   - feeling generally happy  - feeling safe at home            Last Recorded Vitals:  Vitals:    05/07/24 0836   BP: (!) 138/93   Pulse: 74   Weight: 81.6 kg (180 lb)   Height: 1.727 m (5' 8\")        Past Medical History:  She has a past medical history of Abnormal uterine and vaginal bleeding, unspecified, Acute vaginitis (10/24/2016), Acute vaginitis (01/04/2016), Encounter for screening for malignant " neoplasm of vagina, Other conditions influencing health status, Other injury of unspecified body region, initial encounter (08/29/2014), Other specified noninflammatory disorders of vagina (12/31/2015), Other specified noninflammatory disorders of vagina (05/27/2016), Pain, unspecified (03/22/2018), Personal history of diseases of the skin and subcutaneous tissue (09/23/2013), Personal history of other diseases of the female genital tract (04/27/2015), Personal history of other diseases of the female genital tract, Personal history of other diseases of the respiratory system, Personal history of other medical treatment, Personal history of other specified conditions (12/08/2015), Urinary tract infection, site not specified (11/27/2018), and Urinary tract infection, site not specified (09/16/2017).     Past Surgical History:  She has a past surgical history that includes Other surgical history (03/10/2014); Other surgical history (03/10/2014); Other surgical history (03/23/2021); and Hysterectomy (03/21/2018).     Social History:  She reports that she has never smoked. She has never used smokeless tobacco. She reports current alcohol use. She reports that she does not use drugs.     Family History:  Family History   Problem Relation Name Age of Onset    Diabetes Mother      Other (chronic kidney disease) Mother      Lung cancer Mother      Emphysema Father      Other (esophageal abnormality [Other]) Sister      Cancer Sister      Diabetes Maternal Grandmother       Allergies:  Acetaminophen-codeine, Oxycodone-acetaminophen, Shellfish containing products, Shrimp, and Tramadol     Outpatient Medications:  Current Outpatient Medications   Medication Instructions    albuterol (ProAir HFA) 90 mcg/actuation inhaler 2 puffs, inhalation, Every 4 hours PRN    B complex-vitamin C tablet 1 tablet, oral, Daily    B complex-vitamin C-folic acid (Nephro-Ena Rx) 1- mg-mg-mcg tablet 1 tablet, oral, Daily with breakfast     calcium carbonate-vitamin D3 500 mg-5 mcg (200 unit) tablet 1 tablet, oral, Daily    estradiol (Estrace) 0.01 % (0.1 mg/gram) vaginal cream INSERT [1] GRAM VAGINALLY TWO NIGHTS PER WEEK    estradiol (Vivelle-DOT) 0.075 mg/24 hr patch APPLY 1 PATCH TRANSDERMALLY TWO TIMES A WEEK    hydroCHLOROthiazide (HYDRODiuril) 25 mg tablet TAKE 1 TABLET BY MOUTH DAILY AS DIRECTED    multivitamin tablet 1 tablet, oral, Daily        Physical Exam:  GENERAL: Well developed, well nourished, alert and cooperative, and appears to be in no acute distress.     PSYCH: mood pleasant and appropriate     HEAD: normocephalic     EYES: PERRL, EOMI. vision is grossly intact.          NOSE:  Nares patent b/l.   No bleeding nasal polyps. No nasal discharge.     THROAT:    Oropharynx clear.  No inflammation, swelling, exudate, or lesions.   Teeth and gingiva in good general condition.     NECK: Neck supple, non-tender.   No masses, no thyromegaly.  No anterior cervical lymphadenopathy.     CARDIAC:   RRR.   No murmur.       LUNGS: Good respiratory effort.  Clear to auscultation b/l without rales, rhonchi, wheezing.     ABD: soft, nontender       GAIT: Normal          EXTREMITIES: All 4 extremities are warm and well perfused.            Last Labs:  CBC -  Lab Results   Component Value Date    WBC 5.3 05/19/2023    HGB 13.1 05/19/2023    HCT 42.8 05/19/2023    MCV 86 05/19/2023     05/19/2023        CMP -  Lab Results   Component Value Date    CALCIUM 9.7 05/19/2023    PROT 7.1 05/19/2023    ALBUMIN 3.8 05/19/2023    AST 14 05/19/2023    ALT 11 05/19/2023    ALKPHOS 43 05/19/2023    BILITOT 0.6 05/19/2023        LIPID PANEL -  Lab Results   Component Value Date    CHOL 186 05/19/2023    TRIG 65 05/19/2023    HDL 65.1 05/19/2023    CHHDL 2.9 05/19/2023    LDLF 108 (H) 05/19/2023    VLDL 13 05/19/2023           Lab Results   Component Value Date    HGBA1C 6.3 (A) 05/19/2023                 Assessment/Plan   Problem List Items Addressed This  Visit             ICD-10-CM    Well adult exam - Primary Z00.00     Still recommend intermittently checking your bp's at home and writing down the readings.   - if top number over 140 consistently- call the office.      Fasting lab work was ordered today. It is likely that your insurance will cover the testing, but if you have any concerns- please check with your insurance company before getting the blood work drawn. I will call you when I get the results.   Please do not eat for 12 hours before getting your blood work drawn (water is ok).     Routine follow up with your GYN.    If labs do show DM- then we'll set up a visit.     Follow up in a year.        Hung Zhao, DO

## 2024-05-08 LAB
ALBUMIN SERPL BCP-MCNC: 4.4 G/DL (ref 3.4–5)
ALP SERPL-CCNC: 44 U/L (ref 33–110)
ALT SERPL W P-5'-P-CCNC: 14 U/L (ref 7–45)
ANION GAP SERPL CALC-SCNC: 13 MMOL/L (ref 10–20)
AST SERPL W P-5'-P-CCNC: 16 U/L (ref 9–39)
BILIRUB SERPL-MCNC: 0.5 MG/DL (ref 0–1.2)
BUN SERPL-MCNC: 12 MG/DL (ref 6–23)
CALCIUM SERPL-MCNC: 9.8 MG/DL (ref 8.6–10.6)
CHLORIDE SERPL-SCNC: 101 MMOL/L (ref 98–107)
CHOLEST SERPL-MCNC: 242 MG/DL (ref 0–199)
CHOLESTEROL/HDL RATIO: 2.9
CO2 SERPL-SCNC: 30 MMOL/L (ref 21–32)
CREAT SERPL-MCNC: 0.87 MG/DL (ref 0.5–1.05)
EGFRCR SERPLBLD CKD-EPI 2021: 83 ML/MIN/1.73M*2
EST. AVERAGE GLUCOSE BLD GHB EST-MCNC: 126 MG/DL
GLUCOSE SERPL-MCNC: 99 MG/DL (ref 74–99)
HBA1C MFR BLD: 6 %
HDLC SERPL-MCNC: 83.2 MG/DL
LDLC SERPL CALC-MCNC: 144 MG/DL
NON HDL CHOLESTEROL: 159 MG/DL (ref 0–149)
POTASSIUM SERPL-SCNC: 3.6 MMOL/L (ref 3.5–5.3)
PROT SERPL-MCNC: 7.7 G/DL (ref 6.4–8.2)
SODIUM SERPL-SCNC: 140 MMOL/L (ref 136–145)
TRIGL SERPL-MCNC: 73 MG/DL (ref 0–149)
TSH SERPL-ACNC: 2.06 MIU/L (ref 0.44–3.98)
VLDL: 15 MG/DL (ref 0–40)

## 2024-05-09 ENCOUNTER — TELEPHONE (OUTPATIENT)
Dept: PRIMARY CARE | Facility: CLINIC | Age: 49
End: 2024-05-09
Payer: COMMERCIAL

## 2024-06-07 PROCEDURE — RXMED WILLOW AMBULATORY MEDICATION CHARGE

## 2024-06-12 ENCOUNTER — PHARMACY VISIT (OUTPATIENT)
Dept: PHARMACY | Facility: CLINIC | Age: 49
End: 2024-06-12
Payer: COMMERCIAL

## 2024-07-03 PROCEDURE — RXMED WILLOW AMBULATORY MEDICATION CHARGE

## 2024-07-05 ENCOUNTER — PHARMACY VISIT (OUTPATIENT)
Dept: PHARMACY | Facility: CLINIC | Age: 49
End: 2024-07-05
Payer: COMMERCIAL

## 2024-07-23 PROCEDURE — RXMED WILLOW AMBULATORY MEDICATION CHARGE

## 2024-07-25 ENCOUNTER — PHARMACY VISIT (OUTPATIENT)
Dept: PHARMACY | Facility: CLINIC | Age: 49
End: 2024-07-25
Payer: COMMERCIAL

## 2024-08-21 ENCOUNTER — TELEPHONE (OUTPATIENT)
Dept: PRIMARY CARE | Facility: CLINIC | Age: 49
End: 2024-08-21
Payer: COMMERCIAL

## 2024-09-06 PROCEDURE — RXMED WILLOW AMBULATORY MEDICATION CHARGE

## 2024-09-09 ENCOUNTER — PHARMACY VISIT (OUTPATIENT)
Dept: PHARMACY | Facility: CLINIC | Age: 49
End: 2024-09-09
Payer: COMMERCIAL

## 2024-09-19 ENCOUNTER — APPOINTMENT (OUTPATIENT)
Dept: ORTHOPEDIC SURGERY | Facility: HOSPITAL | Age: 49
End: 2024-09-19
Payer: COMMERCIAL

## 2024-09-19 ENCOUNTER — APPOINTMENT (OUTPATIENT)
Dept: ORTHOPEDIC SURGERY | Facility: CLINIC | Age: 49
End: 2024-09-19
Payer: COMMERCIAL

## 2024-09-19 DIAGNOSIS — M17.12 ARTHRITIS OF LEFT KNEE: Primary | ICD-10-CM

## 2024-09-19 DIAGNOSIS — M25.562 ACUTE PAIN OF LEFT KNEE: ICD-10-CM

## 2024-09-19 PROCEDURE — 20610 DRAIN/INJ JOINT/BURSA W/O US: CPT | Performed by: ORTHOPAEDIC SURGERY

## 2024-09-19 PROCEDURE — 99213 OFFICE O/P EST LOW 20 MIN: CPT | Performed by: ORTHOPAEDIC SURGERY

## 2024-09-19 RX ORDER — TRIAMCINOLONE ACETONIDE 40 MG/ML
40 INJECTION, SUSPENSION INTRA-ARTICULAR; INTRAMUSCULAR
Status: COMPLETED | OUTPATIENT
Start: 2024-09-19 | End: 2024-09-19

## 2024-09-19 RX ORDER — LIDOCAINE HYDROCHLORIDE 10 MG/ML
2 INJECTION, SOLUTION INFILTRATION; PERINEURAL
Status: COMPLETED | OUTPATIENT
Start: 2024-09-19 | End: 2024-09-19

## 2024-09-19 RX ORDER — MELOXICAM 15 MG/1
TABLET ORAL
Qty: 90 TABLET | Refills: 0 | Status: SHIPPED | OUTPATIENT
Start: 2024-09-19

## 2024-09-19 NOTE — PROGRESS NOTES
49-year-old is seen with left knee pain.  She benefited from a cortisone injection in February.  She been having persistent moderate throbbing pain that is worse with standing and walking.  She has difficulty going up and down stairs and getting up and down from a chair in and out of a car.    Pleasant in no acute distress.  Walks an antalgic aper left knee has a mild effusion.  Range of motion is 3 to 120 degrees.  Knee stable to varus and alga stress Lachman and posterior drawer.  There is pain along the anterior aspect of the knee.  Right knee range of motion is 0 to 125 degrees without effusion instability or tenderness.    A discussion about arthritis was done.  Treatment options were reviewed.  The decision was made proceed with cortisone injection.  Viscosupplementation injections were denied by her insurance.  She was prescribed meloxicam.  She will be fit for a hinged knee brace.  She will follow-up based on her symptoms.    L Inj/Asp: L knee on 9/19/2024 10:45 AM  Indications: pain  Details: 22 G needle, superolateral approach  Medications: 40 mg triamcinolone acetonide 40 mg/mL; 2 mL lidocaine 10 mg/mL (1 %)  Procedure, treatment alternatives, risks and benefits explained, specific risks discussed. Consent was given by the patient.

## 2024-09-27 ENCOUNTER — PHARMACY VISIT (OUTPATIENT)
Dept: PHARMACY | Facility: CLINIC | Age: 49
End: 2024-09-27
Payer: COMMERCIAL

## 2024-09-27 PROCEDURE — RXMED WILLOW AMBULATORY MEDICATION CHARGE

## 2024-10-27 DIAGNOSIS — I10 ESSENTIAL HYPERTENSION: ICD-10-CM

## 2024-10-28 PROCEDURE — RXMED WILLOW AMBULATORY MEDICATION CHARGE

## 2024-10-28 RX ORDER — HYDROCHLOROTHIAZIDE 25 MG/1
25 TABLET ORAL DAILY
Qty: 90 TABLET | Refills: 1 | Status: SHIPPED | OUTPATIENT
Start: 2024-10-28 | End: 2025-10-28

## 2024-10-29 ENCOUNTER — PHARMACY VISIT (OUTPATIENT)
Dept: PHARMACY | Facility: CLINIC | Age: 49
End: 2024-10-29
Payer: COMMERCIAL

## 2024-10-30 ENCOUNTER — OFFICE VISIT (OUTPATIENT)
Dept: URGENT CARE | Age: 49
End: 2024-10-30
Payer: COMMERCIAL

## 2024-10-30 ENCOUNTER — PHARMACY VISIT (OUTPATIENT)
Dept: PHARMACY | Facility: CLINIC | Age: 49
End: 2024-10-30
Payer: COMMERCIAL

## 2024-10-30 VITALS
SYSTOLIC BLOOD PRESSURE: 139 MMHG | HEART RATE: 76 BPM | OXYGEN SATURATION: 98 % | RESPIRATION RATE: 16 BRPM | TEMPERATURE: 98.2 F | DIASTOLIC BLOOD PRESSURE: 92 MMHG

## 2024-10-30 DIAGNOSIS — J01.10 ACUTE NON-RECURRENT FRONTAL SINUSITIS: Primary | ICD-10-CM

## 2024-10-30 PROCEDURE — RXMED WILLOW AMBULATORY MEDICATION CHARGE

## 2024-10-30 PROCEDURE — 3080F DIAST BP >= 90 MM HG: CPT | Performed by: NURSE PRACTITIONER

## 2024-10-30 PROCEDURE — 3075F SYST BP GE 130 - 139MM HG: CPT | Performed by: NURSE PRACTITIONER

## 2024-10-30 PROCEDURE — 99213 OFFICE O/P EST LOW 20 MIN: CPT | Performed by: NURSE PRACTITIONER

## 2024-10-30 RX ORDER — BENZONATATE 200 MG/1
200 CAPSULE ORAL 3 TIMES DAILY PRN
Qty: 30 CAPSULE | Refills: 0 | Status: SHIPPED | OUTPATIENT
Start: 2024-10-30 | End: 2024-11-09

## 2024-10-30 RX ORDER — AMOXICILLIN AND CLAVULANATE POTASSIUM 875; 125 MG/1; MG/1
1 TABLET, FILM COATED ORAL 2 TIMES DAILY
Qty: 20 TABLET | Refills: 0 | Status: SHIPPED | OUTPATIENT
Start: 2024-10-30 | End: 2024-11-09

## 2024-10-30 ASSESSMENT — ENCOUNTER SYMPTOMS
SINUS COMPLAINT: 1
FEVER: 0
SINUS PAIN: 1
SORE THROAT: 0
HEADACHES: 1
APPETITE CHANGE: 0
VOMITING: 0
RHINORRHEA: 1
COUGH: 1
SINUS PRESSURE: 1
DIARRHEA: 0
CHILLS: 0
NAUSEA: 0

## 2024-12-05 PROCEDURE — RXMED WILLOW AMBULATORY MEDICATION CHARGE

## 2024-12-07 ENCOUNTER — PHARMACY VISIT (OUTPATIENT)
Dept: PHARMACY | Facility: CLINIC | Age: 49
End: 2024-12-07
Payer: COMMERCIAL

## 2025-02-03 PROCEDURE — RXMED WILLOW AMBULATORY MEDICATION CHARGE

## 2025-02-06 ENCOUNTER — PHARMACY VISIT (OUTPATIENT)
Dept: PHARMACY | Facility: CLINIC | Age: 50
End: 2025-02-06
Payer: COMMERCIAL

## 2025-02-20 DIAGNOSIS — Z01.419 ENCOUNTER FOR ANNUAL ROUTINE GYNECOLOGICAL EXAMINATION: ICD-10-CM

## 2025-02-20 PROCEDURE — RXMED WILLOW AMBULATORY MEDICATION CHARGE

## 2025-02-20 RX ORDER — ESTRADIOL 0.07 MG/D
FILM, EXTENDED RELEASE TRANSDERMAL
Qty: 24 PATCH | Refills: 3 | Status: SHIPPED | OUTPATIENT
Start: 2025-02-20 | End: 2026-02-20

## 2025-02-28 ENCOUNTER — PHARMACY VISIT (OUTPATIENT)
Dept: PHARMACY | Facility: CLINIC | Age: 50
End: 2025-02-28
Payer: COMMERCIAL

## 2025-03-17 NOTE — PROGRESS NOTES
Assessment/Plan   Problem List Items Addressed This Visit       Well woman exam    Overview     History  Last pap: HYST - MICHELLE  History of abnormal: N/A  Mammogram:   History of abnormal:  History of STI:  Contraception: N/A    Sexual Health  Active with: Male  Pain: No  Lubrication: No (uses VET)  Orgasm: No    Family Cancer History  Breast: No  Ovarian: No  Endometrial: No  Colon: No    Social  Occupation: Medical records @   Lives with:    Alcohol < 7 drinks per week: Less  Tobacco/vaping: No    Screening tests age 45 or greater  Colon cancer screening:   Colonoscopy up to date  Calcium CT Scoring:  ASCVD scoring:  Serum screenings up to date:  DEXA screening:      Safety/Education  Feels safe in home: Yes  Has been forced in sexual activity in last year: No  Calcium/Vitamin D supplementation - Calcium 1,200mg supplement or 300mg dietary per day plus 5,000u per day Vitamin D 3  Importance of adequate sleep: Minimum of 6 hours per night for heart health  Stress reduction/mindfulness:  Plays game.  Bowls.  Works out                   Other Visit Diagnoses       Encounter for annual routine gynecological examination    -  Primary    Relevant Medications    estradiol (Vivelle-DOT) 0.075 mg/24 hr patch    estradiol (Estrace) 0.01 % (0.1 mg/gram) vaginal cream    Encounter for screening mammogram for malignant neoplasm of breast        Relevant Orders    BI mammo bilateral screening tomosynthesis             Abby Murphy, DANIS-ALDO     Subjective   The patient is here for a well woman exam.    She is postmenopausal and denies any PMB  She has been menopausal (surgical) menopause for 3 years  She reports vasomotor symptoms: no      Review of Systems    Objective :  see vital signs and BMI      General:   Alert and oriented x 3   Heart:  Thyroid: Regular rate, rhythm  Euthyroid, normal shape and size   Lungs:  Breast: Clear to auscultation bilaterally  Symmetrical, no skin changes/nipple discharge, redness,  tenderness, no masses palpated bilaterally   Abdomen: Soft, non tender   Vulva: EGBUS normal   Vagina: Atrophic, normal discharge   Cervix: Absent   Uterus: Absent   Adnexa: Absent       Thank you for coming to see me for your visit today and most importantly thank you for having a preventative visit.  If lab work was sent, you will see your test result via [employer]  Any prescriptions will be sent electronically to your pharmacy listed    I look forward to seeing you next year for your health care needs.  Please remember:     Exercise such as walking for 20 minutes per day is beneficial to your physical and mental healt   Healthy diets can be expensive -- if you every feel like you are struggling to afford healthy food, please let me know as we have a very good program called [employer] that is available to you    Be well!  Lissa

## 2025-03-19 ENCOUNTER — APPOINTMENT (OUTPATIENT)
Dept: OBSTETRICS AND GYNECOLOGY | Facility: CLINIC | Age: 50
End: 2025-03-19
Payer: COMMERCIAL

## 2025-03-19 VITALS — WEIGHT: 181 LBS | HEIGHT: 68 IN | BODY MASS INDEX: 27.43 KG/M2

## 2025-03-19 DIAGNOSIS — Z01.419 ENCOUNTER FOR ANNUAL ROUTINE GYNECOLOGICAL EXAMINATION: Primary | ICD-10-CM

## 2025-03-19 DIAGNOSIS — Z12.31 ENCOUNTER FOR SCREENING MAMMOGRAM FOR MALIGNANT NEOPLASM OF BREAST: ICD-10-CM

## 2025-03-19 DIAGNOSIS — Z01.419 WELL WOMAN EXAM: ICD-10-CM

## 2025-03-19 PROCEDURE — 99396 PREV VISIT EST AGE 40-64: CPT | Performed by: ADVANCED PRACTICE MIDWIFE

## 2025-03-19 PROCEDURE — 1036F TOBACCO NON-USER: CPT | Performed by: ADVANCED PRACTICE MIDWIFE

## 2025-03-19 PROCEDURE — 3008F BODY MASS INDEX DOCD: CPT | Performed by: ADVANCED PRACTICE MIDWIFE

## 2025-03-19 RX ORDER — ESTRADIOL 0.1 MG/G
CREAM VAGINAL
Qty: 42.5 G | Refills: 3 | Status: SHIPPED | OUTPATIENT
Start: 2025-03-19 | End: 2026-03-19

## 2025-03-19 RX ORDER — ESTRADIOL 0.07 MG/D
FILM, EXTENDED RELEASE TRANSDERMAL
Qty: 24 PATCH | Refills: 3 | Status: SHIPPED | OUTPATIENT
Start: 2025-03-19 | End: 2026-03-19

## 2025-03-19 ASSESSMENT — COLUMBIA-SUICIDE SEVERITY RATING SCALE - C-SSRS
1. IN THE PAST MONTH, HAVE YOU WISHED YOU WERE DEAD OR WISHED YOU COULD GO TO SLEEP AND NOT WAKE UP?: NO
6. HAVE YOU EVER DONE ANYTHING, STARTED TO DO ANYTHING, OR PREPARED TO DO ANYTHING TO END YOUR LIFE?: NO
2. HAVE YOU ACTUALLY HAD ANY THOUGHTS OF KILLING YOURSELF?: NO

## 2025-03-19 ASSESSMENT — PATIENT HEALTH QUESTIONNAIRE - PHQ9
SUM OF ALL RESPONSES TO PHQ9 QUESTIONS 1 AND 2: 0
2. FEELING DOWN, DEPRESSED OR HOPELESS: NOT AT ALL
1. LITTLE INTEREST OR PLEASURE IN DOING THINGS: NOT AT ALL

## 2025-04-16 ENCOUNTER — HOSPITAL ENCOUNTER (OUTPATIENT)
Dept: RADIOLOGY | Facility: CLINIC | Age: 50
Discharge: HOME | End: 2025-04-16
Payer: COMMERCIAL

## 2025-04-16 ENCOUNTER — APPOINTMENT (OUTPATIENT)
Dept: RADIOLOGY | Facility: CLINIC | Age: 50
End: 2025-04-16
Payer: COMMERCIAL

## 2025-04-16 DIAGNOSIS — Z12.31 ENCOUNTER FOR SCREENING MAMMOGRAM FOR MALIGNANT NEOPLASM OF BREAST: ICD-10-CM

## 2025-04-16 PROCEDURE — 77063 BREAST TOMOSYNTHESIS BI: CPT | Performed by: RADIOLOGY

## 2025-04-16 PROCEDURE — 77067 SCR MAMMO BI INCL CAD: CPT

## 2025-04-16 PROCEDURE — 77067 SCR MAMMO BI INCL CAD: CPT | Performed by: RADIOLOGY

## 2025-04-30 DIAGNOSIS — I10 ESSENTIAL HYPERTENSION: ICD-10-CM

## 2025-04-30 PROCEDURE — RXMED WILLOW AMBULATORY MEDICATION CHARGE

## 2025-04-30 RX ORDER — HYDROCHLOROTHIAZIDE 25 MG/1
25 TABLET ORAL DAILY
Qty: 90 TABLET | Refills: 1 | Status: SHIPPED | OUTPATIENT
Start: 2025-04-30 | End: 2026-04-30

## 2025-05-01 ENCOUNTER — PHARMACY VISIT (OUTPATIENT)
Dept: PHARMACY | Facility: CLINIC | Age: 50
End: 2025-05-01
Payer: COMMERCIAL

## 2025-05-08 ENCOUNTER — APPOINTMENT (OUTPATIENT)
Dept: PRIMARY CARE | Facility: CLINIC | Age: 50
End: 2025-05-08
Payer: COMMERCIAL

## 2025-05-08 VITALS
SYSTOLIC BLOOD PRESSURE: 143 MMHG | WEIGHT: 177 LBS | HEIGHT: 68 IN | DIASTOLIC BLOOD PRESSURE: 88 MMHG | BODY MASS INDEX: 26.83 KG/M2 | HEART RATE: 81 BPM

## 2025-05-08 DIAGNOSIS — Z00.00 WELL ADULT EXAM: Primary | ICD-10-CM

## 2025-05-08 PROCEDURE — 3008F BODY MASS INDEX DOCD: CPT | Performed by: FAMILY MEDICINE

## 2025-05-08 PROCEDURE — 3077F SYST BP >= 140 MM HG: CPT | Performed by: FAMILY MEDICINE

## 2025-05-08 PROCEDURE — 3079F DIAST BP 80-89 MM HG: CPT | Performed by: FAMILY MEDICINE

## 2025-05-08 PROCEDURE — 1036F TOBACCO NON-USER: CPT | Performed by: FAMILY MEDICINE

## 2025-05-08 PROCEDURE — 99396 PREV VISIT EST AGE 40-64: CPT | Performed by: FAMILY MEDICINE

## 2025-05-08 ASSESSMENT — PROMIS GLOBAL HEALTH SCALE
RATE_PHYSICAL_HEALTH: VERY GOOD
RATE_QUALITY_OF_LIFE: VERY GOOD
RATE_GENERAL_HEALTH: VERY GOOD
CARRYOUT_SOCIAL_ACTIVITIES: VERY GOOD
EMOTIONAL_PROBLEMS: RARELY
RATE_AVERAGE_PAIN: 5
CARRYOUT_PHYSICAL_ACTIVITIES: COMPLETELY
RATE_MENTAL_HEALTH: GOOD
RATE_SOCIAL_SATISFACTION: VERY GOOD

## 2025-05-08 ASSESSMENT — ENCOUNTER SYMPTOMS
OCCASIONAL FEELINGS OF UNSTEADINESS: 0
DEPRESSION: 0
LOSS OF SENSATION IN FEET: 0

## 2025-05-08 ASSESSMENT — PATIENT HEALTH QUESTIONNAIRE - PHQ9
1. LITTLE INTEREST OR PLEASURE IN DOING THINGS: NOT AT ALL
SUM OF ALL RESPONSES TO PHQ9 QUESTIONS 1 AND 2: 0
2. FEELING DOWN, DEPRESSED OR HOPELESS: NOT AT ALL

## 2025-05-08 NOTE — PROGRESS NOTES
Patient presents for annual physical.  Patient has no other concerns      Chief Complaint:  No chief complaint on file.  History Of Present Illness:   Niranjan Rodrigues is a 50 y.o. female          HTN, asthma, recurrent sinusitis and seasonal allergies who is presenting for a physical.            Weight- intentionally losing weight and exercising more.   - drinks mushroom coffee.      A1c: was 6.3% May 2023.  - then 6.0% in May 2024.               Healthcare team:  - 2015 cardiology for left chest pain  - 2021 podiatry  - 2022- allergy Hostoffer  - ortho Victoroff shoulder, Ross knee.   - GYN     BP/HLD - concerns as family history is indicates Hypertension and Hyperlipidemia. Patient instructed to get blood pressure cuff and maintain a blood pressure diary and report back on follow-up. Patient has borderline high cholesterol, but dietary modifications and exercise have worked well for her. April 2022 update: agreed to start hctz and get a home bp monitor. Oct 2022 update: 100s-120 systolic. agreed to continue hctz. sometimes she notices blood pressure spikes with certain foods.   May 2024 update: bp well controlled on hydrochlorothiazide.   May 2025 update: bp well controlled on hydrochlorothiazide. Home bp well controlled.      Anemia - CBC done in October 2020, but was told she had low iron levels, however low suspicion as MCV within normal limits.     Left chest wall pain. Had before in 2015 and saw cardiologist.      Allergies - seasonal allergies that last year-round and has sinus-related symptoms (dry eyes, runny nose, sore throat). Has tried nasal spray in past with no relief. Patient is agreeable to seeing Allergy Specialist in future. April 2022 update: has seen Hostoffer, feels better on nasal sprays. needs albuterol refill. take claritin or zyrtec. May 2023 update: she thinks she may have a sinus infection. coughing up phlegm, we discussed an antibiotic.      Asthma - Patient takes Albuterol PRN and  "uses 1-2 times on \"bad day\". No recent exacerbations, but would like refill of Albuterol.             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 April 2022 update: she will see ortho/Estelita. Oct 2022 update: hemp topical seems to be helping. May 2024 update: seems to be doing ok.     May 2021 visit: Left knee: She has had left knee pain for the past few months even before her GYN procedure in March. She is trying to get in good shape and go to the gym. She notices it most when she is sitting and then stands up she will notice in the posterior aspect of her left knee. She does like to bowl and uses an knee brace, we discussed getting another knee brace for her which would offer more support. She likes participate in water aerobics but still has knee pain with water aerobics. She does squats in the gym which do cause pain in the anterior aspect of her left knee.   May 2023 update: injection was temporarily helpful, does not want another injection now. she is still bowling. reaction knee brace is helpful but she needs another one.   Nov 2023 update: still didn't get reaction knee brace. Would like it for bowling.   May 2024 update: got another injection from ortho that is currently helpful.      PMHx: Asthma, arthritis of wrist (Left), bursitis of shoulder (right), sinusitis  Past surgical Hx: partial hysterectomy with left oophorectomy secondary to ovarian mass, nerve entrapment of left wrist. GYN procedure 2021.     Allergies: seasonal, Advil (palpitations), Tramadol, Tylenol 3    Medications: Aleve, Albuterol. estradiol.    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     Family History: Mom -  at 61 from lung cancer, sister -  at 44 from esophageal cancer, father -  (history of emphysema). Diabetes, Hypertension, hyperlipidemia on maternal side        Tdap: 2021  COVID: vaccinated and boosted.   Mammogram: 2025- ordered by GYN.  GYN: saw GYN .      Colonoscopy/EGD: May 2018- unremarkable. repeat colonoscopy .      Exercise: wears knee sleeve and knee brace, working out.                    Review of Systems:     Negative  Constitutional:   - fever   - chills   - night sweats  - unexpected weight change  - changes in sleep     Eyes:   - loss of vision  - double vision  - floaters     Ear/Nose/Throat/Mouth:   - sore throat  - hearing changes  - sinus congestion     Cardiovascular:   - chest pain  - chest heaviness  - palpitations  - swelling in ankles          Respiratory:   - shortness of breath  - difficulty breathing  - frequent cough  - wheezing     Neurological:   - loss of consciousness  - tremors  - dizzy spells  - numbness   - tingling     Gastrointestinal:   - abdominal pain  - nausea  - vomiting  - constipation  - diarrhea  - bloody stools  - loss of bowel control  - indigestion  - heartburn  - sour taste in throat when waking up     Genitourinary:   - urinary incontinence  - increased urinary frequency  - painful urination  - blood in urine     Skin:   - Rash  - lumps or bumps  - worrisome moles     Endocrine:   - excessive thirst  - feeling too hot  - feeling too cold  - feeling tired  - fatigue      Hematologic/Lymphatic:   - swollen glands  - blood clotting problems  - easy bruising.     Psychological:   - feelings of depression  - feelings of anxiety.     Sexual:   - sexual health concerns        Positive  Psychological:   - feeling generally happy  - feeling safe at home           "  Last Recorded Vitals:  Vitals:    05/08/25 0837   BP: 143/88   BP Location: Right arm   Patient Position: Sitting   BP Cuff Size: Adult   Pulse: 81   Weight: 80.3 kg (177 lb)   Height: 1.727 m (5' 8\")        Past Medical History:  She has a past medical history of Abnormal uterine and vaginal bleeding, unspecified, Acute vaginitis (10/24/2016), Acute vaginitis (01/04/2016), Encounter for screening for malignant neoplasm of vagina, Other conditions influencing health status, Other injury of unspecified body region, initial encounter (08/29/2014), Other specified noninflammatory disorders of vagina (12/31/2015), Other specified noninflammatory disorders of vagina (05/27/2016), Pain, unspecified (03/22/2018), Personal history of diseases of the skin and subcutaneous tissue (09/23/2013), Personal history of other diseases of the female genital tract (04/27/2015), Personal history of other diseases of the female genital tract, Personal history of other diseases of the respiratory system, Personal history of other medical treatment, Personal history of other specified conditions (12/08/2015), Urinary tract infection, site not specified (11/27/2018), and Urinary tract infection, site not specified (09/16/2017).     Past Surgical History:  She has a past surgical history that includes Other surgical history (03/10/2014); Other surgical history (03/10/2014); Other surgical history (03/23/2021); and Hysterectomy (03/21/2018).     Social History:  She reports that she has never smoked. She has never used smokeless tobacco. She reports current alcohol use. She reports that she does not use drugs.     Family History:  Family History   Problem Relation Name Age of Onset    Diabetes Mother      Other (chronic kidney disease) Mother      Lung cancer Mother      Emphysema Father      Other (esophageal abnormality [Other]) Sister      Cancer Sister      Diabetes Maternal Grandmother       Allergies:  Acetaminophen-codeine, " Oxycodone-acetaminophen, Shellfish containing products, Shrimp, and Tramadol     Outpatient Medications:  Current Outpatient Medications   Medication Instructions    albuterol (ProAir HFA) 90 mcg/actuation inhaler 2 puffs, inhalation, Every 4 hours PRN    B complex-vitamin C tablet 1 tablet, Daily    B complex-vitamin C-folic acid (Nephro-Ena Rx) 1- mg-mg-mcg tablet 1 tablet, Daily with breakfast    calcium carbonate-vitamin D3 500 mg-5 mcg (200 unit) tablet 1 tablet, Daily    estradiol (Estrace) 0.01 % (0.1 mg/gram) vaginal cream INSERT [1] GRAM VAGINALLY TWO NIGHTS PER WEEK    estradiol (Vivelle-DOT) 0.075 mg/24 hr patch APPLY 1 PATCH TRANSDERMALLY TWO TIMES A WEEK    hydroCHLOROthiazide (HYDRODiuril) 25 mg tablet TAKE 1 TABLET BY MOUTH DAILY AS DIRECTED    meloxicam (Mobic) 15 mg tablet Take one tablet once daily    multivitamin tablet 1 tablet, Daily        Physical Exam:  GENERAL: Well developed, well nourished, alert and cooperative, and appears to be in no acute distress.     PSYCH: mood pleasant and appropriate     HEAD: normocephalic     EYES: PERRL, EOMI. vision is grossly intact.          NOSE:  Nares patent b/l.   No bleeding nasal polyps. No nasal discharge.     THROAT:    Oropharynx clear.  No inflammation, swelling, exudate, or lesions.   Teeth and gingiva in good general condition.     NECK: Neck supple, non-tender.   No masses, no thyromegaly.  No anterior cervical lymphadenopathy.     CARDIAC:   RRR.   No murmur.       LUNGS: Good respiratory effort.  Clear to auscultation b/l without rales, rhonchi, wheezing.     ABD: soft, nontender       GAIT: Normal          EXTREMITIES: All 4 extremities are warm and well perfused.            Last Labs:  CBC -  Lab Results   Component Value Date    WBC 5.1 05/07/2024    HGB 13.4 05/07/2024    HCT 45.2 05/07/2024    MCV 90 05/07/2024     05/07/2024        CMP -  Lab Results   Component Value Date    CALCIUM 9.8 05/07/2024    PROT 7.7 05/07/2024     ALBUMIN 4.4 05/07/2024    AST 16 05/07/2024    ALT 14 05/07/2024    ALKPHOS 44 05/07/2024    BILITOT 0.5 05/07/2024        LIPID PANEL -  Lab Results   Component Value Date    CHOL 242 (H) 05/07/2024    TRIG 73 05/07/2024    HDL 83.2 05/07/2024    CHHDL 2.9 05/07/2024    LDLF 108 (H) 05/19/2023    VLDL 15 05/07/2024    NHDL 159 (H) 05/07/2024           Lab Results   Component Value Date    HGBA1C 6.0 (H) 05/07/2024          1. Well adult exam                 Still recommend intermittently checking your bp's at home and writing down the readings.   - if top number over 140 consistently- call the office.      Fasting lab work was ordered today. It is likely that your insurance will cover the testing, but if you have any concerns- please check with your insurance company before getting the blood work drawn. I will call you when I get the results.   Please do not eat for 12 hours before getting your blood work drawn (water is ok).     Routine follow up with your GYN.        Follow up in a year.        Hung Zhao, DO

## 2025-05-23 LAB
ALBUMIN SERPL-MCNC: 4.2 G/DL (ref 3.6–5.1)
ALP SERPL-CCNC: 44 U/L (ref 31–125)
ALT SERPL-CCNC: 10 U/L (ref 6–29)
ANION GAP SERPL CALCULATED.4IONS-SCNC: 10 MMOL/L (CALC) (ref 7–17)
AST SERPL-CCNC: 14 U/L (ref 10–35)
BILIRUB SERPL-MCNC: 0.5 MG/DL (ref 0.2–1.2)
BUN SERPL-MCNC: 15 MG/DL (ref 7–25)
CALCIUM SERPL-MCNC: 9.5 MG/DL (ref 8.6–10.2)
CHLORIDE SERPL-SCNC: 101 MMOL/L (ref 98–110)
CHOLEST SERPL-MCNC: 214 MG/DL
CHOLEST/HDLC SERPL: 2.9 (CALC)
CO2 SERPL-SCNC: 30 MMOL/L (ref 20–32)
CREAT SERPL-MCNC: 0.88 MG/DL (ref 0.5–0.99)
EGFRCR SERPLBLD CKD-EPI 2021: 81 ML/MIN/1.73M2
ERYTHROCYTE [DISTWIDTH] IN BLOOD BY AUTOMATED COUNT: 12.8 % (ref 11–15)
EST. AVERAGE GLUCOSE BLD GHB EST-MCNC: 131 MG/DL
EST. AVERAGE GLUCOSE BLD GHB EST-SCNC: 7.3 MMOL/L
GLUCOSE SERPL-MCNC: 88 MG/DL (ref 65–99)
HBA1C MFR BLD: 6.2 %
HCT VFR BLD AUTO: 41.6 % (ref 35–45)
HDLC SERPL-MCNC: 74 MG/DL
HGB BLD-MCNC: 13 G/DL (ref 11.7–15.5)
LDLC SERPL CALC-MCNC: 124 MG/DL (CALC)
MCH RBC QN AUTO: 26.7 PG (ref 27–33)
MCHC RBC AUTO-ENTMCNC: 31.3 G/DL (ref 32–36)
MCV RBC AUTO: 85.4 FL (ref 80–100)
NONHDLC SERPL-MCNC: 140 MG/DL (CALC)
PLATELET # BLD AUTO: 212 THOUSAND/UL (ref 140–400)
PMV BLD REES-ECKER: 12.4 FL (ref 7.5–12.5)
POTASSIUM SERPL-SCNC: 3.5 MMOL/L (ref 3.5–5.3)
PROT SERPL-MCNC: 7.3 G/DL (ref 6.1–8.1)
RBC # BLD AUTO: 4.87 MILLION/UL (ref 3.8–5.1)
SODIUM SERPL-SCNC: 141 MMOL/L (ref 135–146)
TRIGL SERPL-MCNC: 67 MG/DL
WBC # BLD AUTO: 5.2 THOUSAND/UL (ref 3.8–10.8)

## 2025-06-22 ENCOUNTER — OFFICE VISIT (OUTPATIENT)
Dept: URGENT CARE | Age: 50
End: 2025-06-22
Payer: COMMERCIAL

## 2025-06-22 VITALS
RESPIRATION RATE: 14 BRPM | SYSTOLIC BLOOD PRESSURE: 128 MMHG | DIASTOLIC BLOOD PRESSURE: 81 MMHG | OXYGEN SATURATION: 97 % | HEART RATE: 89 BPM | TEMPERATURE: 97.5 F

## 2025-06-22 DIAGNOSIS — L30.9 DERMATITIS: Primary | ICD-10-CM

## 2025-06-22 PROCEDURE — 99213 OFFICE O/P EST LOW 20 MIN: CPT | Performed by: STUDENT IN AN ORGANIZED HEALTH CARE EDUCATION/TRAINING PROGRAM

## 2025-06-22 PROCEDURE — 3074F SYST BP LT 130 MM HG: CPT | Performed by: STUDENT IN AN ORGANIZED HEALTH CARE EDUCATION/TRAINING PROGRAM

## 2025-06-22 PROCEDURE — 3079F DIAST BP 80-89 MM HG: CPT | Performed by: STUDENT IN AN ORGANIZED HEALTH CARE EDUCATION/TRAINING PROGRAM

## 2025-06-22 RX ORDER — TRIAMCINOLONE ACETONIDE 1 MG/G
OINTMENT TOPICAL 2 TIMES DAILY
Qty: 60 G | Refills: 0 | Status: SHIPPED | OUTPATIENT
Start: 2025-06-22 | End: 2025-07-02

## 2025-06-22 RX ORDER — PREDNISONE 20 MG/1
40 TABLET ORAL DAILY
Qty: 10 TABLET | Refills: 0 | Status: SHIPPED | OUTPATIENT
Start: 2025-06-22 | End: 2025-06-27

## 2025-06-22 NOTE — PROGRESS NOTES
Subjective   Patient ID: Niranjan Rodrigues is a 50 y.o. female. They present today with a chief complaint of Rash (X 1 day. TD-MA).    History of Present Illness    Rash      For a chief complaint of pruritic rash on bilateral arms worse on the left also site of origin ear and right lower leg over the last day, patient believes it may be due to chocolate covered almonds she ate more cheese curls as he states that something tasted off, no report of respiratory distress no fevers no vomiting no feeling of throat closure, patient is been taking Benadryl for little resolve and antihistamine spray  Past Medical History  Allergies as of 06/22/2025 - Reviewed 06/22/2025   Allergen Reaction Noted    Acetaminophen-codeine Nausea Only and Nausea/vomiting 11/05/2023    Oxycodone-acetaminophen Hallucinations 11/05/2023    Shellfish containing products Other, Swelling, and Itching 11/05/2023    Shrimp Itching 11/05/2023    Tramadol Unknown and Nausea/vomiting 11/05/2023       Prescriptions Prior to Admission[1]     Medical History[2]    Surgical History[3]     reports that she has never smoked. She has never used smokeless tobacco. She reports current alcohol use. She reports that she does not use drugs.    Review of Systems  Review of Systems   Skin:  Positive for rash.                                  Objective    Vitals:    06/22/25 1637   BP: 128/81   Pulse: 89   Resp: 14   Temp: 36.4 °C (97.5 °F)   SpO2: 97%     No LMP recorded (lmp unknown). Patient has had a hysterectomy.    Physical Exam  Vitals and nursing note reviewed.   Constitutional:       General: She is not in acute distress.     Appearance: Normal appearance. She is not ill-appearing or toxic-appearing.   Cardiovascular:      Rate and Rhythm: Normal rate.      Pulses: Normal pulses.      Heart sounds: Normal heart sounds.   Pulmonary:      Effort: Pulmonary effort is normal. No respiratory distress.      Breath sounds: Normal breath sounds.   Skin:     Findings:  Rash present.      Comments: Upon examination of patient's skin there is the presence of several raised wheals, nontender to palpation none maculopapular nonvesicular, although do not rodrick with palpation   Neurological:      General: No focal deficit present.      Mental Status: She is alert and oriented to person, place, and time.   Psychiatric:         Mood and Affect: Mood normal.         Behavior: Behavior normal.         Procedures    Point of Care Test & Imaging Results from this visit  No results found for this visit on 06/22/25.   Imaging  No results found.    Cardiology, Vascular, and Other Imaging  No other imaging results found for the past 2 days      Diagnostic study results (if any) were reviewed by Josué Pereira PA-C.    Assessment/Plan   Allergies, medications, history, and pertinent labs/EKGs/Imaging reviewed by Josué Pereira PA-C.     Medical Decision Making  I did discuss with patient she may continue to use Benadryl, will also place on triamcinolone topical along with prednisone burst and discussed use of over-the-counter Pepcid AC, if no resolution or regression of symptoms in 5 to 7 days patient may follow-up with primary care or return to urgent care for reevaluation, any signs of respiratory distress patient is to go to the emergency room or call [phone] at time of visit I do not suspect anaphylaxis    Orders and Diagnoses  Diagnoses and all orders for this visit:  Dermatitis  -     triamcinolone (Kenalog) 0.1 % ointment; Apply topically 2 times a day for 10 days.  -     predniSONE (Deltasone) 20 mg tablet; Take 2 tablets (40 mg) by mouth once daily for 5 days.      Medical Admin Record      Patient disposition: Home    Electronically signed by Josué Pereira PA-C  4:47 PM           [1] (Not in a hospital admission)   [2]   Past Medical History:  Diagnosis Date    Abnormal uterine and vaginal bleeding, unspecified     Abnormal uterine bleeding    Acute vaginitis 10/24/2016     Bacterial vaginosis    Acute vaginitis 01/04/2016    Bacterial vaginosis    Encounter for screening for malignant neoplasm of vagina     Vaginal Pap smear    Other conditions influencing health status     History of pregnancy    Other injury of unspecified body region, initial encounter 08/29/2014    Bruising    Other specified noninflammatory disorders of vagina 12/31/2015    Vaginal irritation    Other specified noninflammatory disorders of vagina 05/27/2016    Vaginal odor    Pain, unspecified 03/22/2018    Pain    Personal history of diseases of the skin and subcutaneous tissue 09/23/2013    History of abscess of skin and subcutaneous tissue    Personal history of other diseases of the female genital tract 04/27/2015    History of vaginitis    Personal history of other diseases of the female genital tract     Personal history of ovarian cyst    Personal history of other diseases of the respiratory system     Personal history of asthma    Personal history of other medical treatment     History of mammogram    Personal history of other specified conditions 12/08/2015    History of dysuria    Urinary tract infection, site not specified 11/27/2018    Acute lower UTI    Urinary tract infection, site not specified 09/16/2017    Acute lower UTI   [3]   Past Surgical History:  Procedure Laterality Date    HYSTERECTOMY  03/21/2018    Hysterectomy    OTHER SURGICAL HISTORY  03/10/2014    Laparoscopy With Total Hysterectomy    OTHER SURGICAL HISTORY  03/10/2014    Wrist Surgery Left    OTHER SURGICAL HISTORY  03/23/2021    Laparoscopic oophorectomy

## 2025-07-31 PROCEDURE — RXMED WILLOW AMBULATORY MEDICATION CHARGE

## 2025-08-05 ENCOUNTER — PHARMACY VISIT (OUTPATIENT)
Dept: PHARMACY | Facility: CLINIC | Age: 50
End: 2025-08-05
Payer: COMMERCIAL

## 2025-08-13 ENCOUNTER — PHARMACY VISIT (OUTPATIENT)
Dept: PHARMACY | Facility: CLINIC | Age: 50
End: 2025-08-13
Payer: COMMERCIAL

## 2025-08-13 PROCEDURE — RXMED WILLOW AMBULATORY MEDICATION CHARGE
